# Patient Record
Sex: MALE | Race: WHITE
[De-identification: names, ages, dates, MRNs, and addresses within clinical notes are randomized per-mention and may not be internally consistent; named-entity substitution may affect disease eponyms.]

---

## 2018-04-05 ENCOUNTER — HOSPITAL ENCOUNTER (OUTPATIENT)
Dept: HOSPITAL 24 - SURG1 | Age: 78
Discharge: HOME | End: 2018-04-05
Attending: INTERNAL MEDICINE
Payer: COMMERCIAL

## 2018-04-05 VITALS — DIASTOLIC BLOOD PRESSURE: 61 MMHG | SYSTOLIC BLOOD PRESSURE: 109 MMHG

## 2018-04-05 DIAGNOSIS — K29.60: ICD-10-CM

## 2018-04-05 DIAGNOSIS — K21.9: ICD-10-CM

## 2018-04-05 DIAGNOSIS — K20.8: ICD-10-CM

## 2018-04-05 DIAGNOSIS — R13.19: Primary | ICD-10-CM

## 2018-04-05 DIAGNOSIS — K22.2: ICD-10-CM

## 2018-04-05 DIAGNOSIS — R11.2: ICD-10-CM

## 2018-04-05 PROCEDURE — 0DJ08ZZ INSPECTION OF UPPER INTESTINAL TRACT, VIA NATURAL OR ARTIFICIAL OPENING ENDOSCOPIC: ICD-10-PCS | Performed by: INTERNAL MEDICINE

## 2018-04-05 PROCEDURE — A4217 STERILE WATER/SALINE, 500 ML: HCPCS

## 2018-04-05 PROCEDURE — 99100 ANES PT EXTEME AGE<1 YR&>70: CPT

## 2018-04-05 PROCEDURE — 0DB68ZX EXCISION OF STOMACH, VIA NATURAL OR ARTIFICIAL OPENING ENDOSCOPIC, DIAGNOSTIC: ICD-10-PCS | Performed by: INTERNAL MEDICINE

## 2018-04-05 PROCEDURE — 0D757ZZ DILATION OF ESOPHAGUS, VIA NATURAL OR ARTIFICIAL OPENING: ICD-10-PCS | Performed by: INTERNAL MEDICINE

## 2018-04-11 ENCOUNTER — HOSPITAL ENCOUNTER (OUTPATIENT)
Dept: HOSPITAL 24 - MED/SURG | Age: 78
Setting detail: OBSERVATION
LOS: 2 days | Discharge: HOME | End: 2018-04-13
Attending: INTERNAL MEDICINE | Admitting: INTERNAL MEDICINE
Payer: COMMERCIAL

## 2018-04-11 VITALS — BODY MASS INDEX: 17.8 KG/M2

## 2018-04-11 DIAGNOSIS — R11.10: ICD-10-CM

## 2018-04-11 DIAGNOSIS — E55.9: ICD-10-CM

## 2018-04-11 DIAGNOSIS — K21.9: ICD-10-CM

## 2018-04-11 DIAGNOSIS — R63.0: ICD-10-CM

## 2018-04-11 DIAGNOSIS — I10: ICD-10-CM

## 2018-04-11 DIAGNOSIS — D64.89: ICD-10-CM

## 2018-04-11 DIAGNOSIS — R13.11: ICD-10-CM

## 2018-04-11 DIAGNOSIS — R26.89: ICD-10-CM

## 2018-04-11 DIAGNOSIS — E86.0: Primary | ICD-10-CM

## 2018-04-11 DIAGNOSIS — R53.1: ICD-10-CM

## 2018-04-11 DIAGNOSIS — F32.89: ICD-10-CM

## 2018-04-11 DIAGNOSIS — M51.16: ICD-10-CM

## 2018-04-11 DIAGNOSIS — R06.02: ICD-10-CM

## 2018-04-11 DIAGNOSIS — R94.4: ICD-10-CM

## 2018-04-11 LAB
ALBUMIN SERPL BCP-MCNC: 3.3 G/DL (ref 3.4–5)
ALP SERPL-CCNC: 70 UNITS/L (ref 46–116)
ALT SERPL W P-5'-P-CCNC: 18 UNITS/L (ref 12–78)
AST SERPL W P-5'-P-CCNC: 25 UNITS/L (ref 15–37)
BACTERIA URNS QL MICRO: (no result) /HPF
BASOPHILS # BLD AUTO: 0.1 X10^3/UL (ref 0–0.1)
BASOPHILS NFR BLD AUTO: 1.6 % (ref 0.2–1)
BILIRUB UR QL STRIP.AUTO: NEGATIVE
BUN SERPL-MCNC: 21 MG/DL (ref 7–18)
CALCIUM ALBUM COR SERPL-SCNC: (no result) MMOL/L (ref 136–145)
CALCIUM ALBUM COR SERPL-SCNC: 9 MG/DL (ref 8.5–10.1)
CALCIUM SERPL-MCNC: 8.4 MG/DL (ref 8.5–10.1)
CHLORIDE SERPL-SCNC: 100 MMOL/L (ref 98–107)
CLARITY UR: (no result)
CO2 SERPL-SCNC: 31.4 MMOL/L (ref 21–32)
COARSE GRAN CASTS URNS QL MICRO: (no result) /HPF
COLOR UR AUTO: (no result)
CREAT SERPL-MCNC: 1.71 MG/DL (ref 0.7–1.3)
EGFR  BLACK RACES: 50 (ref 60–?)
EOSINOPHIL # BLD AUTO: 0.2 X10^3/UL (ref 0–0.2)
EOSINOPHIL NFR BLD AUTO: 2.3 % (ref 0.9–2.9)
ERYTHROCYTE [DISTWIDTH] IN BLOOD BY AUTOMATED COUNT: 15.9 % (ref 11.6–16.5)
GLUCOSE UR QL STRIP.AUTO: NEGATIVE
HCT VFR BLD AUTO: 32.8 % (ref 42–54)
HGB BLD-MCNC: 11 G/DL (ref 13.5–18)
KETONES UR QL STRIP.AUTO: NEGATIVE
LEUKOCYTE ESTERASE UR QL STRIP.AUTO: (no result)
LYMPHOCYTES # BLD AUTO: 2.1 X10^3/UL (ref 1.3–2.9)
LYMPHOCYTES NFR BLD AUTO: 23.8 % (ref 21–51)
MCH RBC QN AUTO: 29 PG (ref 27–34)
MCHC RBC AUTO-ENTMCNC: 33.6 G/DL (ref 33–35)
MCV RBC AUTO: 86.2 FL (ref 80–100)
MONOCYTES # BLD AUTO: 0.6 X10^3/UL (ref 0.3–0.8)
MONOCYTES NFR BLD AUTO: 7.3 % (ref 0–13)
NEUTROPHILS # BLD AUTO: 5.7 X10^3/UL (ref 2.2–4.8)
NEUTROPHILS NFR BLD AUTO: 65 % (ref 42–75)
NITRITE UR QL STRIP.AUTO: NEGATIVE
PH UR STRIP.AUTO: 6 [PH] (ref 5–8)
PLATELET # BLD: 229 X10^3/UL (ref 150–450)
PMV BLD AUTO: 9 FL (ref 7.4–11)
PROT SERPL-MCNC: 7.9 G/DL (ref 6.4–8.2)
PROT UR QL STRIP.AUTO: (no result)
RBC # BLD AUTO: 3.8 X10^6/UL (ref 4.7–6)
RBC # UR STRIP.AUTO: (no result) /UL
RBC #/AREA URNS HPF: (no result) /HPF
SODIUM SERPL-SCNC: 137 MMOL/L (ref 136–145)
SP GR UR STRIP.AUTO: 1.01 (ref 1–1.03)
SQUAMOUS URNS QL MICRO: (no result) /HPF
UROBILINOGEN UR QL STRIP.AUTO: (no result)
WBC NRBC COR # BLD AUTO: 8.7 X10^3/UL (ref 3.6–10)

## 2018-04-11 PROCEDURE — 36415 COLL VENOUS BLD VENIPUNCTURE: CPT

## 2018-04-11 PROCEDURE — G0378 HOSPITAL OBSERVATION PER HR: HCPCS

## 2018-04-11 PROCEDURE — S0028 INJECTION, FAMOTIDINE, 20 MG: HCPCS

## 2018-04-11 PROCEDURE — S0179 MEGESTROL 20 MG: HCPCS

## 2018-04-11 PROCEDURE — 94760 N-INVAS EAR/PLS OXIMETRY 1: CPT

## 2018-04-11 PROCEDURE — 85025 COMPLETE CBC W/AUTO DIFF WBC: CPT

## 2018-04-11 PROCEDURE — C9113 INJ PANTOPRAZOLE SODIUM, VIA: HCPCS

## 2018-04-11 PROCEDURE — 81001 URINALYSIS AUTO W/SCOPE: CPT

## 2018-04-11 PROCEDURE — 71045 X-RAY EXAM CHEST 1 VIEW: CPT

## 2018-04-11 PROCEDURE — 83735 ASSAY OF MAGNESIUM: CPT

## 2018-04-11 PROCEDURE — 80053 COMPREHEN METABOLIC PANEL: CPT

## 2018-04-11 RX ADMIN — VENLAFAXINE HYDROCHLORIDE SCH MG: 75 CAPSULE, EXTENDED RELEASE ORAL at 11:12

## 2018-04-11 RX ADMIN — FAMOTIDINE SCH MLS/HR: 20 INJECTION, SOLUTION INTRAVENOUS at 11:12

## 2018-04-11 RX ADMIN — PANTOPRAZOLE SODIUM SCH: 40 INJECTION, POWDER, FOR SOLUTION INTRAVENOUS at 11:08

## 2018-04-11 RX ADMIN — SODIUM CHLORIDE SCH MLS/HR: 9 INJECTION, SOLUTION INTRAVENOUS at 10:50

## 2018-04-11 RX ADMIN — PANTOPRAZOLE SODIUM SCH MG: 40 INJECTION, POWDER, FOR SOLUTION INTRAVENOUS at 20:33

## 2018-04-11 RX ADMIN — FAMOTIDINE SCH MLS/HR: 20 INJECTION, SOLUTION INTRAVENOUS at 20:33

## 2018-04-12 LAB
ALBUMIN SERPL BCP-MCNC: 2.5 G/DL (ref 3.4–5)
ALP SERPL-CCNC: 59 UNITS/L (ref 46–116)
ALT SERPL W P-5'-P-CCNC: 14 UNITS/L (ref 12–78)
AST SERPL W P-5'-P-CCNC: 14 UNITS/L (ref 15–37)
BASOPHILS # BLD AUTO: 0.1 X10^3/UL (ref 0–0.1)
BASOPHILS NFR BLD AUTO: 1.9 % (ref 0.2–1)
BUN SERPL-MCNC: 19 MG/DL (ref 7–18)
CALCIUM ALBUM COR SERPL-SCNC: (no result) MMOL/L (ref 136–145)
CALCIUM ALBUM COR SERPL-SCNC: 8.7 MG/DL (ref 8.5–10.1)
CALCIUM SERPL-MCNC: 7.5 MG/DL (ref 8.5–10.1)
CHLORIDE SERPL-SCNC: 107 MMOL/L (ref 98–107)
CO2 SERPL-SCNC: 25.5 MMOL/L (ref 21–32)
CREAT SERPL-MCNC: 1.41 MG/DL (ref 0.7–1.3)
EGFR  BLACK RACES: > 60 (ref 60–?)
EOSINOPHIL # BLD AUTO: 0.2 X10^3/UL (ref 0–0.2)
EOSINOPHIL NFR BLD AUTO: 2.9 % (ref 0.9–2.9)
ERYTHROCYTE [DISTWIDTH] IN BLOOD BY AUTOMATED COUNT: 15.5 % (ref 11.6–16.5)
HCT VFR BLD AUTO: 30 % (ref 42–54)
HGB BLD-MCNC: 10.1 G/DL (ref 13.5–18)
LYMPHOCYTES # BLD AUTO: 2.1 X10^3/UL (ref 1.3–2.9)
LYMPHOCYTES NFR BLD AUTO: 27 % (ref 21–51)
MCH RBC QN AUTO: 28.8 PG (ref 27–34)
MCHC RBC AUTO-ENTMCNC: 33.5 G/DL (ref 33–35)
MCV RBC AUTO: 85.9 FL (ref 80–100)
MONOCYTES # BLD AUTO: 0.5 X10^3/UL (ref 0.3–0.8)
MONOCYTES NFR BLD AUTO: 6.9 % (ref 0–13)
NEUTROPHILS # BLD AUTO: 4.9 X10^3/UL (ref 2.2–4.8)
NEUTROPHILS NFR BLD AUTO: 61.3 % (ref 42–75)
PLATELET # BLD: 202 X10^3/UL (ref 150–450)
PMV BLD AUTO: 9.1 FL (ref 7.4–11)
PROT SERPL-MCNC: 6.2 G/DL (ref 6.4–8.2)
RBC # BLD AUTO: 3.49 X10^6/UL (ref 4.7–6)
SODIUM SERPL-SCNC: 140 MMOL/L (ref 136–145)
WBC NRBC COR # BLD AUTO: 7.9 X10^3/UL (ref 3.6–10)

## 2018-04-12 RX ADMIN — PANTOPRAZOLE SODIUM SCH MG: 40 INJECTION, POWDER, FOR SOLUTION INTRAVENOUS at 20:27

## 2018-04-12 RX ADMIN — FAMOTIDINE SCH MLS/HR: 20 INJECTION, SOLUTION INTRAVENOUS at 09:09

## 2018-04-12 RX ADMIN — VENLAFAXINE HYDROCHLORIDE SCH MG: 75 CAPSULE, EXTENDED RELEASE ORAL at 09:10

## 2018-04-12 RX ADMIN — SODIUM CHLORIDE SCH MLS/HR: 9 INJECTION, SOLUTION INTRAVENOUS at 14:35

## 2018-04-12 RX ADMIN — SODIUM CHLORIDE SCH MLS/HR: 9 INJECTION, SOLUTION INTRAVENOUS at 01:18

## 2018-04-12 RX ADMIN — MEGESTROL ACETATE SCH MG: 40 TABLET ORAL at 14:35

## 2018-04-12 RX ADMIN — PANTOPRAZOLE SODIUM SCH MG: 40 INJECTION, POWDER, FOR SOLUTION INTRAVENOUS at 09:09

## 2018-04-12 RX ADMIN — CARVEDILOL SCH MG: 6.25 TABLET, FILM COATED ORAL at 20:28

## 2018-04-12 RX ADMIN — FAMOTIDINE SCH MLS/HR: 20 INJECTION, SOLUTION INTRAVENOUS at 20:27

## 2018-04-12 RX ADMIN — MEGESTROL ACETATE SCH MG: 40 TABLET ORAL at 20:28

## 2018-04-12 RX ADMIN — AMIODARONE HYDROCHLORIDE SCH MG: 200 TABLET ORAL at 14:35

## 2018-04-13 VITALS — SYSTOLIC BLOOD PRESSURE: 139 MMHG | DIASTOLIC BLOOD PRESSURE: 68 MMHG

## 2018-04-13 LAB
ALBUMIN SERPL BCP-MCNC: 2.7 G/DL (ref 3.4–5)
ALP SERPL-CCNC: 61 UNITS/L (ref 46–116)
ALT SERPL W P-5'-P-CCNC: 17 UNITS/L (ref 12–78)
AST SERPL W P-5'-P-CCNC: 19 UNITS/L (ref 15–37)
BASOPHILS # BLD AUTO: 0.1 X10^3/UL (ref 0–0.1)
BASOPHILS NFR BLD AUTO: 1.5 % (ref 0.2–1)
BUN SERPL-MCNC: 13 MG/DL (ref 7–18)
CALCIUM ALBUM COR SERPL-SCNC: (no result) MMOL/L (ref 136–145)
CALCIUM ALBUM COR SERPL-SCNC: 8.8 MG/DL (ref 8.5–10.1)
CALCIUM SERPL-MCNC: 7.8 MG/DL (ref 8.5–10.1)
CHLORIDE SERPL-SCNC: 108 MMOL/L (ref 98–107)
CO2 SERPL-SCNC: 23.6 MMOL/L (ref 21–32)
CREAT SERPL-MCNC: 1.23 MG/DL (ref 0.7–1.3)
EGFR  BLACK RACES: > 60 (ref 60–?)
EOSINOPHIL # BLD AUTO: 0.2 X10^3/UL (ref 0–0.2)
EOSINOPHIL NFR BLD AUTO: 1.7 % (ref 0.9–2.9)
ERYTHROCYTE [DISTWIDTH] IN BLOOD BY AUTOMATED COUNT: 15.5 % (ref 11.6–16.5)
HCT VFR BLD AUTO: 30.7 % (ref 42–54)
HGB BLD-MCNC: 10.4 G/DL (ref 13.5–18)
LYMPHOCYTES # BLD AUTO: 2.2 X10^3/UL (ref 1.3–2.9)
LYMPHOCYTES NFR BLD AUTO: 22.1 % (ref 21–51)
MCH RBC QN AUTO: 29.4 PG (ref 27–34)
MCHC RBC AUTO-ENTMCNC: 34 G/DL (ref 33–35)
MCV RBC AUTO: 86.4 FL (ref 80–100)
MONOCYTES # BLD AUTO: 0.8 X10^3/UL (ref 0.3–0.8)
MONOCYTES NFR BLD AUTO: 7.7 % (ref 0–13)
NEUTROPHILS # BLD AUTO: 6.6 X10^3/UL (ref 2.2–4.8)
NEUTROPHILS NFR BLD AUTO: 67 % (ref 42–75)
PLATELET # BLD: 211 X10^3/UL (ref 150–450)
PMV BLD AUTO: 9.1 FL (ref 7.4–11)
PROT SERPL-MCNC: 6.5 G/DL (ref 6.4–8.2)
RBC # BLD AUTO: 3.56 X10^6/UL (ref 4.7–6)
SODIUM SERPL-SCNC: 140 MMOL/L (ref 136–145)
WBC NRBC COR # BLD AUTO: 9.8 X10^3/UL (ref 3.6–10)

## 2018-04-13 RX ADMIN — SODIUM CHLORIDE SCH MLS/HR: 9 INJECTION, SOLUTION INTRAVENOUS at 04:18

## 2018-04-13 RX ADMIN — MEGESTROL ACETATE SCH MG: 40 TABLET ORAL at 09:52

## 2018-04-13 RX ADMIN — FAMOTIDINE SCH MLS/HR: 20 INJECTION, SOLUTION INTRAVENOUS at 09:53

## 2018-04-13 RX ADMIN — PANTOPRAZOLE SODIUM SCH MG: 40 INJECTION, POWDER, FOR SOLUTION INTRAVENOUS at 09:53

## 2018-04-13 RX ADMIN — VENLAFAXINE HYDROCHLORIDE SCH MG: 75 CAPSULE, EXTENDED RELEASE ORAL at 09:52

## 2018-04-13 RX ADMIN — AMIODARONE HYDROCHLORIDE SCH MG: 200 TABLET ORAL at 09:52

## 2018-04-13 RX ADMIN — CARVEDILOL SCH MG: 6.25 TABLET, FILM COATED ORAL at 09:52

## 2018-04-15 ENCOUNTER — HOSPITAL ENCOUNTER (EMERGENCY)
Dept: HOSPITAL 24 - ER | Age: 78
Discharge: HOME | End: 2018-04-15
Payer: COMMERCIAL

## 2018-04-15 VITALS — SYSTOLIC BLOOD PRESSURE: 122 MMHG | DIASTOLIC BLOOD PRESSURE: 78 MMHG

## 2018-04-15 VITALS — BODY MASS INDEX: 16.7 KG/M2

## 2018-04-15 DIAGNOSIS — S00.33XA: ICD-10-CM
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
## 2018-04-15 DIAGNOSIS — S01.01XA: Primary | ICD-10-CM

## 2018-04-15 DIAGNOSIS — S51.812A: ICD-10-CM

## 2018-04-15 DIAGNOSIS — S00.03XA: ICD-10-CM
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
## 2018-04-15 DIAGNOSIS — S00.31XA: ICD-10-CM

## 2018-04-15 PROCEDURE — 99282 EMERGENCY DEPT VISIT SF MDM: CPT

## 2018-04-15 PROCEDURE — 0WQ2XZZ REPAIR FACE, EXTERNAL APPROACH: ICD-10-PCS | Performed by: INTERNAL MEDICINE

## 2018-04-15 PROCEDURE — 12013 RPR F/E/E/N/L/M 2.6-5.0 CM: CPT

## 2018-04-15 PROCEDURE — 70450 CT HEAD/BRAIN W/O DYE: CPT

## 2018-04-15 NOTE — DR.GENAD
HPI





- PCP


Primary Care Physician: vamsi





- Complaint/Symptoms


Chief Complaint Doctors Comments: FELL AT HOME AND HIT HEAD ON TUB AND SUSTAIN 

SKIN TEAR TO LT FOREARM AND LACERATION TO LT EYEBROW. PATIENT LOC. TD UTD.


Chief Complaint:: pt fell and hit head on bath tub laceration to lt eye brow 

with loc





- Nurses notes reviewed


Nurses Notes Review: Yes





- Source


History Provided: Patient





- Mode of Arrival


Mode of Arrival: Wheelchair





- Timing


Onset of Chief Complaint: 04/15/18


Came on: Suddenly





- Duration


Duration: Constant


Duration: Days





- Severity


Severity: Moderate





PMH





- PMH


Past Medical History: Yes


Past Medical History: Anxiety, Arthritis, Hypertension, Hypothyroidism


Past Surgical History: Yes


Surgical History: Bowel Resection





- Family History


History of Family Medical Conditions: No


Family Medical History: Cancer, Heart Failure, Hypertension





- Social History


Does patient currently use any type of tobacco product: No


Have you used tobacco products in the last 12 months: No


Does any household member use tobacco: No


Alcohol Use: Occasionally


Do you use any recreational Drugs:: No


Lives Where: Home





- infectious screening


In the last 2 months have you had wt loss of >10#?: NO


Have you had fever, night sweats or hemotysis?: No


Have you traveled outside the country in the last 6 months?: No


Isolation: Standard





ROS





- Review of Systems


Constitutional: No Symptoms Reported


Eyes: Other (LT EARBROW LACERATION 3CM.)


ENTM: Nose Pain (SWELLING AND ABRSION ON NOSE WITH PAIN.)


Respiratoy: No Symptoms Reported


Cardiovascular: No Symptoms Reported


Gastrointestinal/Abdominal: No Symptoms Reported


Genitourinary: No Symptoms Reported


Neurological: No Symptoms Reported


Musculoskeletal: No Symptoms Reported


Integumentary: Other (ABRASION NOSE, SKIN TEAR LT FOREARM.)


Hematologic/Lymphatic: Easy Bleeding, Easy Bruising


Endocrine: No Symptoms Reported.  negative: Flushing


All Other Systems: Reviewed and Negative





PE





- Vital Signs


Vitals: 


 





Temperature                      97.6 F


Pulse Rate                       58


Respiratory Rate                 20


Blood Pressure [Left Arm]        122/78


Blood Pressure [Right Arm]       163/84


Blood Pressure [Right Radial     117/68


Artery]                          


Blood Pressure                   137/66


O2 Sat by Pulse Oximetry         98











- General


Limitations: No Limitations


General Appearance: Alert





- Head


Head Exam: Other (3CM LAC LT EYEBROW.)





- Eyes


Eye exam: PERRL, EOMI, Periorbital Swelling (LT EYE)





- ENT


ENT Exam: Normal  External Ear Exam


External Ear Exam: Normal External Inspection


TM/Canal Exam: Bilateral Normal


Nose Exam: Abrasion (NOSE)


Mouth Exam: Normal Inspection


Throat Exam: Normal Inspection





- Neck


Neck Exam: Trachea Midline.  negative: Tenderness, Meningismus, Lymphadenopathy





- Chest


Chest Inspection: Symmetric Chest Wall Rise





- Respiratory


Respiratory Exam: Normal Lung Sounds Bilat


Respiratory Exam: Bilateral Clear to Auscultation





- Cardiovascular


Cardiovascular Exam: Regular Rate, Normal Rhythm, Normal Heart Sounds





- Abdominal Exam


Abdominal Exam: Normal Bowel Sounds, Soft.  negative: Tenderness





- Extremities


Extremities Exam: Tenderness (SKIN TEAR LT FOREARM)





- Back


Back Exam: Normal Inspection





- Neurologic


Neurological Exam: Alert, Oriented X3





- Psychiatric


Psychiatric Exam: Normal Affect, Normal Mood





- Skin


Skin Exam: Erythema





MDM





- Additional Information


Additional Information Obtained From: Family





- Differential Diagnosis


Differential Diagnosis: SCALP CONTUSION, LAC LT EYEBROW. SKIN TEAR LT FOREARM





Course





- Treatment


Treatment: SEE ORDERS.





- Education/Counseling


Education/Counseling: Patient, Family, Education


Educated On: Diagnosis, Needs for Follow Up





ROR





- XRAY


XRAY Interpreted by: Radiologist


XRAY Findings: REPORT DISCUSS WITH PATIENT.





Procedures





- Laceration/Wound Repair


  ** Left Face


Wound Length (cm): 3


Wound's Depth, Shape: Linear, Irregular


Wound Explored: contaminated


Betadine Prep?: Yes


Anesthesia: 1% Lidocaine, 1% Lidocaine w/ Epi


Wound Debrided: minimal


Wound Repaired With: sutures


Suture Size/Type: 4:0, Ethilion


Number of Sutures: 6


Layer Closure?: No


Sterile Dressing Applied?: Yes


Splint Applied?: No


Sling Applied?: No





- Diagnosis


Discharge Problem: 


Laceration of scalp


Qualifiers:


 Encounter type: initial encounter Qualified Code(s): S01.01XA - Laceration 

without foreign body of scalp, initial encounter





Contusion of scalp


Qualifiers:


 Encounter type: initial encounter Qualified Code(s): S00.03XA - Contusion of 

scalp, initial encounter





Abrasion of nose


Qualifiers:


 Encounter type: initial encounter Qualified Code(s): S00.31XA - Abrasion of 

nose, initial encounter





Contusion, nose


Qualifiers:


 Encounter type: initial encounter Qualified Code(s): S00.33XA - Contusion of 

nose, initial encounter





Skin tear of left forearm without complication


Qualifiers:


 Encounter type: initial encounter Qualified Code(s): S51.812A - Laceration 

without foreign body of left forearm, initial encounter








- Discharge Plan


Condition: Stable





- Follow ups/Referrals


Follow ups/Referrals: 


Gilberto Curtis [Primary Care Provider] - 3 days





- Instructions


Instructions:  Abrasion, Facial or Scalp Contusion, Easy-to-Read, Skin Tear Care

, Laceration Care, Adult, Easy-to-Read


Additional Instructions: 


RETURN TO ED IF WORSE. SUTURE OUT IN 10 DAYS.

## 2018-04-15 NOTE — CT
CT HEAD WITHOUT CONTRAST



CLINICAL HISTORY: 78-year-old male status post fall with loss of consciousness and laceration to the 
left frontal scalp.



COMPARISON: None.



TECHNIQUE:  Multiple, non-contrasted axial CT images were obtained from the skull base to the cranial
 vertex. Coronal and sagittal reformats were performed.



FINDINGS: There are no abnormal intra- or extra-axial fluid collections, midline shift, or mass effec
t. Gray-white differentiation is normal. Partially empty sella. Global cortical involutional changes 
are present that are advanced for the patient's stated age. The ventricular system is mildly enlarged
 but commensurate with the degree of sulcal prominence. Periventricular and supraventricular white ma
tter hypodensity is present that is nonspecific in appearance, but most likely to represent microvasc
ular ischemic changes. Atherosclerotic vascular calcification is present within the carotid siphons a
nd distal vertebral arteries. 



Left frontal scalp defect with associated small contusion/hematoma.



The imaged paranasal sinuses, mastoid air cells, and tympanic spaces are clear. Bilateral lens implan
ts.



IMPRESSION:



1. No definite evidence of an acute intracranial process.    

2. Small left frontal scalp defect with associated contusion/hematoma.

3. Severe microvascular white matter ischemic changes, with associated volume loss.





Reported By:Electronically Signed by KEVIN DAVIS MD at 4/15/2018 9:25:49 PM

## 2018-04-25 NOTE — DR.UPDATE
H&P Update


History and Physical Update: 


History and Physical reviewed and patient examined.





Changes noted:       Yes with the following:





 PRESENTED TO THE OFFICE TODAY WITH COMPLAINTS OF GENERALIZED WEAKNESS 

AND DECREASED APPETITE. PATIENTS SPOUSE REPORTS THAT HE HAS HAD AN UNSTEADY 

GAIT FOR THE PAST TWO DAYS. ON EXAMINATION, SKIN TURGOR NOTED. HEART REGULAR IN 

RATE AND RHYTHM. BILATERAL LUNGS NOTED WITH DIMINISHED LUNG SOUNDS THROUGHOUT.  

ABDOMEN FLAT, SOFT, NON-TENDER WITH NORMAL BOWEL SOUNDS NOTED IN ALL QUADRANTS. 

WE PLANNED TO ADMIT PATIENT FOR FURTHER EVALUATION AND TREATMENT. ON ADMISSION, 

WE WILL OBTAIN A CBC, CMP, URINALYSIS, AND A CHEST XRAY. HE WILL BE STARTED ON 

NORMAL SALINE AT 80ML/HR. OTHERWISE, WE WILL FOLLOW UP WITH AM LABS AND 

CONTINUE TO MONITOR PATIENT.

## 2018-04-25 NOTE — PCM.PROG
Progress Note





- Progress Note for Day of


Date: 04/12/18





- Subjective


Subjective:  WAS ADMITTED FOR DEHYDRATION AND GENERALIZED WEAKNESS. 

TODAY, HE IS ALERT AND ORIENTED, LYING IN BED ON MORNING ROUNDS. HE CONTINUES 

WITH COMPLAINTS OF GENERALIZED WEAKNESS. HE ALSO REPORTS NAUSEA, BUT DENIES 

VOMITING SINCE YESTERDAY. STAFF REPORTS THAT PATIENT HAS HAD INTERMITTENT 

CONFUSION THROUGHOUT THE NIGHT AND ALSO CONTINUES WITH AN UNSTEADY GAIT. ON 

EXAMINATION, HEART IS REGULAR IN RATE AND RHYTHM. BILATERAL LUNGS CONTINUE WITH 

DIMINISHED LUNG SOUNDS THROGHOUT. ABDOMEN IS FLAT, SOFT, AND NON-TENDER WITH 

NORMAL BOWEL SOUNDS NOTED IN ALL QUADRANTS. NORMAL RANGE OF MOTION NOTED TO 

EXTREMITIES. HIS VITALS THIS MORNING ARE 98.2-64-16-91%-147/79. LABS WERE 

OBTAINED. ABNORMAL LAB VALUES INCLUDE THE FOLLOWING: RBC 3.56, HGB 10.4, HCT 

30.7, CHLORIDE 108, CALCIUM 7.8, ALBUMIN 2.7. TODAY, WE WILL CONTINUE WITH IV 

HYDRATION AND PHYSICAL THERAPY. OTHERWISE, WE WILL FOLLOW UP WITH AM LABS AND 

CONTINUE TO MONITOR PATIENT.





- Past Medical Family Social History


Past Med/Fam/Surg Hx: No changes since H&P


Allergies: 


Allergies





baclofen Allergy (Verified 04/11/18 10:28)


 











- Review of Systems


ROS: No change since H&P





- Vital Signs and I&O's


Vital Signs: 


 





Temperature                      98.6 F


Pulse Rate [Left Brachial]       67


Respiratory Rate                 20


Blood Pressure [Left Arm]        139/68


Blood Pressure [Right Arm]       163/84


Blood Pressure [Right Radial     117/68


Artery]                          


Blood Pressure                   109/61


O2 Sat by Pulse Oximetry         94











- Physical Exam


Oriented: Normal


Eyes: Normal


Ear: Normal


Nose: Normal


Throat: Normal


Respiratory: Generalized, Diminished


Cardiovascular: Normal


: Normal


Auscultation: Bowel Sounds: Normal


Palpation: Normal


Tenderness: Normal


Skin: Decreased Turgur


Musculoskeletal: Normal


Psychiatric: Normal


Mood Description: Calm


Affect: Normal


Speech Pattern: Clear, Appropriate





- Laboratory and Diagnostics


Result Diagrams: 


 04/13/18 05:33





 04/13/18 05:33


Labs: 


 Laboratory











WBC  9.8 X10^3/uL (3.6-10.0)   04/13/18  05:33    


 


RBC  3.56 X10^6/uL (4.7-6.0)  L  04/13/18  05:33    


 


Hgb  10.4 g/dL (13.5-18.0)  L  04/13/18  05:33    


 


Hct  30.7 % (42.0-54.0)  L  04/13/18  05:33    


 


MCV  86.4 fL (80.0-100.0)   04/13/18  05:33    


 


MCH  29.4 pg (27.0-34.0)   04/13/18  05:33    


 


MCHC  34.0 g/dL (33.0-35.0)   04/13/18  05:33    


 


RDW  15.5 % (11.6-16.5)   04/13/18  05:33    


 


Plt Count  211 X10^3/uL (150.0-450.0)   04/13/18  05:33    


 


MPV  9.1 fL (7.4-11.0)   04/13/18  05:33    


 


Neut % (Auto)  67.0 % (42.0-75.0)   04/13/18  05:33    


 


Lymph % (Auto)  22.1 % (21.0-51.0)   04/13/18  05:33    


 


Mono % (Auto)  7.7 % (0.0-13.0)   04/13/18  05:33    


 


Eos % (Auto)  1.7 % (0.9-2.9)   04/13/18  05:33    


 


Baso % (Auto)  1.5 % (0.2-1.0)  H  04/13/18  05:33    


 


Neut # (Auto)  6.6 x10^3/uL (2.2-4.8)  H  04/13/18  05:33    


 


Lymph # (Auto)  2.2 X10^3/uL (1.3-2.9)   04/13/18  05:33    


 


Mono # (Auto)  0.8 x10^3/uL (0.3-0.8)   04/13/18  05:33    


 


Eos # (Auto)  0.2 x10^3/uL (0.0-0.2)   04/13/18  05:33    


 


Baso # (Auto)  0.1 X10^3/uL (0.0-0.1)   04/13/18  05:33    


 


Absolute Nucleated RBC  0.0 /100WBC  04/13/18  05:33    


 


Sodium  140 mmol/L (136-145)   04/13/18  05:33    


 


Corrected Sodium  TNP   04/13/18  05:33    


 


Potassium  3.9 mmol/L (3.5-5.1)   04/13/18  05:33    


 


Chloride  108 mmol/L ()  H  04/13/18  05:33    


 


Carbon Dioxide  23.6 mmol/L (21-32)   04/13/18  05:33    


 


BUN  13 mg/dL (7-18)   04/13/18  05:33    


 


Creatinine  1.23 mg/dL (0.70-1.30)   04/13/18  05:33    


 


Est GFR (MDRD) Af Amer  > 60  (>60)   04/13/18  05:33    


 


Est GFR (MDRD) Non-Af  > 60  (>60)   04/13/18  05:33    


 


Glucose  87 mg/dL (65-99)   04/13/18  05:33    


 


Calcium  7.8 mg/dL (8.5-10.1)  L  04/13/18  05:33    


 


Corrected Calcium  8.8 mg/dL (8.5-10.1)   04/13/18  05:33    


 


Magnesium  1.8 mg/dL (1.7-2.9)   04/12/18  04:50    


 


Total Bilirubin  0.90 mg/dL (0.2-1.0)   04/13/18  05:33    


 


AST  19 Units/L (15-37)   04/13/18  05:33    


 


ALT  17 Units/L (12-78)   04/13/18  05:33    


 


Alkaline Phosphatase  61 Units/L ()   04/13/18  05:33    


 


Total Protein  6.5 g/dL (6.4-8.2)   04/13/18  05:33    


 


Albumin  2.7 g/dL (3.4-5.0)  L  04/13/18  05:33    


 


Globulin  3.8 g/dL (2.5-4.5)   04/13/18  05:33    


 


Albumin/Globulin Ratio  0.7 Ratio (1.1-2.1)  L  04/13/18  05:33    


 


Specimen Type  Clean catch urine   04/11/18  12:48    


 


Urine Color  Dark yellow  (YELLOW)   04/11/18  12:48    


 


Urine Appearance  Hazy  (CLEAR)   04/11/18  12:48    


 


Urine pH  6.0  (5.0 - 8.0)   04/11/18  12:48    


 


Ur Specific Gravity  1.015  (1.000-1.030)   04/11/18  12:48    


 


Urine Protein  2+  (NEGATIVE)   04/11/18  12:48    


 


Urine Glucose (UA)  Negative  (NEGATIVE)   04/11/18  12:48    


 


Urine Ketones  Negative  (NEGATIVE)   04/11/18  12:48    


 


Urine Occult Blood  1+  (NEGATIVE)   04/11/18  12:48    


 


Urine Nitrite  Negative  (NEGATIVE)   04/11/18  12:48    


 


Urine Bilirubin  Negative  (NEGATIVE)   04/11/18  12:48    


 


Urine Urobilinogen  2+  (NORMAL)   04/11/18  12:48    


 


Ur Leukocyte Esterase  1+  (NEGATIVE)   04/11/18  12:48    


 


Urine RBC  0-2 /HPF (NONE SEEN)   04/11/18  12:48    


 


Urine WBC  0-2 /HPF (NONE SEEN)   04/11/18  12:48    


 


Ur Squamous Epith Cells  Few /HPF (NEGATIVE)   04/11/18  12:48    


 


Urine Bacteria  Trace /HPF (NEGATIVE)   04/11/18  12:48    


 


Coarse Granular Casts  Rare /HPF (NEGATIVE)   04/11/18  12:48    


 


Ur Culture Indicated?  No/not indicated   04/11/18  12:48    














- Plan


(1) Dehydration


Status: Acute   


Plan: NORMAL SALINE AT 80ML/HR, CONTINUE TO MONITOR   





(2) Generalized weakness


Status: Acute   


Plan: NORMAL SALINE AT 80ML/HR, PHYSICAL THERAPY, CONTINUE TO MONTIOR   





(3) Decreased appetite


Status: Chronic   


Plan: CONTINUE MEGACE, CONTINUE TO MONITOR

## 2018-05-04 ENCOUNTER — HOSPITAL ENCOUNTER (INPATIENT)
Dept: HOSPITAL 24 - ER | Age: 78
LOS: 1 days | DRG: 204 | End: 2018-05-05
Attending: INTERNAL MEDICINE | Admitting: FAMILY MEDICINE
Payer: COMMERCIAL

## 2018-05-04 VITALS — BODY MASS INDEX: 17.4 KG/M2

## 2018-05-04 DIAGNOSIS — I87.2: ICD-10-CM

## 2018-05-04 DIAGNOSIS — I46.9: ICD-10-CM

## 2018-05-04 DIAGNOSIS — I25.10: ICD-10-CM

## 2018-05-04 DIAGNOSIS — R53.1: ICD-10-CM

## 2018-05-04 DIAGNOSIS — Z66: ICD-10-CM

## 2018-05-04 DIAGNOSIS — R29.6: ICD-10-CM

## 2018-05-04 DIAGNOSIS — R79.89: ICD-10-CM

## 2018-05-04 DIAGNOSIS — J18.8: ICD-10-CM

## 2018-05-04 DIAGNOSIS — R06.2: ICD-10-CM

## 2018-05-04 DIAGNOSIS — E87.6: ICD-10-CM

## 2018-05-04 DIAGNOSIS — Z78.1: ICD-10-CM

## 2018-05-04 DIAGNOSIS — R06.03: Primary | ICD-10-CM

## 2018-05-04 DIAGNOSIS — E86.0: ICD-10-CM

## 2018-05-04 DIAGNOSIS — I95.89: ICD-10-CM

## 2018-05-04 DIAGNOSIS — I10: ICD-10-CM

## 2018-05-04 DIAGNOSIS — R94.31: ICD-10-CM

## 2018-05-04 LAB
ALBUMIN SERPL BCP-MCNC: 2.2 G/DL (ref 3.4–5)
ALBUMIN SERPL BCP-MCNC: 2.4 G/DL (ref 3.4–5)
ALP SERPL-CCNC: 59 UNITS/L (ref 46–116)
ALP SERPL-CCNC: 63 UNITS/L (ref 46–116)
ALT SERPL W P-5'-P-CCNC: 18 UNITS/L (ref 12–78)
ALT SERPL W P-5'-P-CCNC: 18 UNITS/L (ref 12–78)
AMORPH SED URNS QL MICRO: (no result) /HPF
AST SERPL W P-5'-P-CCNC: 26 UNITS/L (ref 15–37)
AST SERPL W P-5'-P-CCNC: 28 UNITS/L (ref 15–37)
BACTERIA URNS QL MICRO: (no result) /HPF
BASE EXCESS BLDA CALC-SCNC: -0.3 MMOL/L (ref -2–2)
BASE EXCESS BLDA CALC-SCNC: -2.2 MMOL/L (ref -2–2)
BASOPHILS # BLD AUTO: 0.1 X10^3/UL (ref 0–0.1)
BASOPHILS # BLD AUTO: 0.1 X10^3/UL (ref 0–0.1)
BASOPHILS NFR BLD AUTO: 0.8 % (ref 0.2–1)
BASOPHILS NFR BLD AUTO: 0.9 % (ref 0.2–1)
BILIRUB UR QL STRIP.AUTO: NEGATIVE
BUN SERPL-MCNC: 30 MG/DL (ref 7–18)
BUN SERPL-MCNC: 32 MG/DL (ref 7–18)
CALCIUM ALBUM COR SERPL-SCNC: 144 MMOL/L (ref 136–145)
CALCIUM ALBUM COR SERPL-SCNC: 146 MMOL/L (ref 136–145)
CALCIUM ALBUM COR SERPL-SCNC: 8.9 MG/DL (ref 8.5–10.1)
CALCIUM ALBUM COR SERPL-SCNC: 9 MG/DL (ref 8.5–10.1)
CALCIUM SERPL-MCNC: 7.6 MG/DL (ref 8.5–10.1)
CALCIUM SERPL-MCNC: 7.6 MG/DL (ref 8.5–10.1)
CHLORIDE SERPL-SCNC: 108 MMOL/L (ref 98–107)
CHLORIDE SERPL-SCNC: 110 MMOL/L (ref 98–107)
CK MB SERPL-MCNC: 1.9 NG/ML (ref 0–4)
CK MB SERPL-MCNC: 2.2 NG/ML (ref 0–4)
CK MB SERPL-MCNC: 2.4 NG/ML (ref 0–4)
CK SERPL-CCNC: 533 UNITS/L (ref 39–308)
CK SERPL-CCNC: 566 UNITS/L (ref 39–308)
CK SERPL-CCNC: 627 UNITS/L (ref 39–308)
CKMB %: 0.3 % (ref ?–4)
CKMB %: 0.4 % (ref ?–4)
CKMB %: 0.4 % (ref ?–4)
CLARITY UR: CLEAR
CO2 SERPL-SCNC: 21.7 MMOL/L (ref 21–32)
CO2 SERPL-SCNC: 22.9 MMOL/L (ref 21–32)
COLOR UR AUTO: YELLOW
CREAT SERPL-MCNC: 2.03 MG/DL (ref 0.7–1.3)
CREAT SERPL-MCNC: 2.35 MG/DL (ref 0.7–1.3)
EGFR  BLACK RACES: 35 (ref 60–?)
EGFR  BLACK RACES: 41 (ref 60–?)
EOSINOPHIL # BLD AUTO: 0 X10^3/UL (ref 0–0.2)
EOSINOPHIL # BLD AUTO: 0.1 X10^3/UL (ref 0–0.2)
EOSINOPHIL NFR BLD AUTO: 0.2 % (ref 0.9–2.9)
EOSINOPHIL NFR BLD AUTO: 0.7 % (ref 0.9–2.9)
ERYTHROCYTE [DISTWIDTH] IN BLOOD BY AUTOMATED COUNT: 15.5 % (ref 11.6–16.5)
ERYTHROCYTE [DISTWIDTH] IN BLOOD BY AUTOMATED COUNT: 15.8 % (ref 11.6–16.5)
FRACTIONATED INSPIRED OXYGEN: 32
FRACTIONATED INSPIRED OXYGEN: 50
GLUCOSE UR QL STRIP.AUTO: NEGATIVE
HCO3 BLDA-SCNC: 21.1 MMOL/L (ref 22–26)
HCO3 BLDA-SCNC: 22.6 MMOL/L (ref 22–26)
HCT VFR BLD AUTO: 24.4 % (ref 42–54)
HCT VFR BLD AUTO: 25.7 % (ref 42–54)
HGB BLD-MCNC: 8.4 G/DL (ref 13.5–18)
HGB BLD-MCNC: 8.6 G/DL (ref 13.5–18)
KETONES UR QL STRIP.AUTO: NEGATIVE
LEUKOCYTE ESTERASE UR QL STRIP.AUTO: NEGATIVE
LYMPHOCYTES # BLD AUTO: 1.2 X10^3/UL (ref 1.3–2.9)
LYMPHOCYTES # BLD AUTO: 1.4 X10^3/UL (ref 1.3–2.9)
LYMPHOCYTES NFR BLD AUTO: 11.9 % (ref 21–51)
LYMPHOCYTES NFR BLD AUTO: 13 % (ref 21–51)
MCH RBC QN AUTO: 29.2 PG (ref 27–34)
MCH RBC QN AUTO: 29.8 PG (ref 27–34)
MCHC RBC AUTO-ENTMCNC: 33.6 G/DL (ref 33–35)
MCHC RBC AUTO-ENTMCNC: 34.5 G/DL (ref 33–35)
MCV RBC AUTO: 86.3 FL (ref 80–100)
MCV RBC AUTO: 86.9 FL (ref 80–100)
MONOCYTES # BLD AUTO: 0.8 X10^3/UL (ref 0.3–0.8)
MONOCYTES # BLD AUTO: 0.8 X10^3/UL (ref 0.3–0.8)
MONOCYTES NFR BLD AUTO: 7.6 % (ref 0–13)
MONOCYTES NFR BLD AUTO: 8.1 % (ref 0–13)
NEUTROPHILS # BLD AUTO: 8.1 X10^3/UL (ref 2.2–4.8)
NEUTROPHILS # BLD AUTO: 8.1 X10^3/UL (ref 2.2–4.8)
NEUTROPHILS NFR BLD AUTO: 77.4 % (ref 42–75)
NEUTROPHILS NFR BLD AUTO: 79.4 % (ref 42–75)
NITRITE UR QL STRIP.AUTO: NEGATIVE
PCO2 BLDA: 31 MMHG (ref 35–45)
PCO2 BLDA: 31 MMHG (ref 35–45)
PH BLDA: 7.44 [PH] (ref 7.35–7.45)
PH BLDA: 7.47 [PH] (ref 7.35–7.45)
PH UR STRIP.AUTO: 5 [PH] (ref 5–8)
PLATELET # BLD: 208 X10^3/UL (ref 150–450)
PLATELET # BLD: 226 X10^3/UL (ref 150–450)
PMV BLD AUTO: 8.7 FL (ref 7.4–11)
PMV BLD AUTO: 8.9 FL (ref 7.4–11)
PO2 BLDA: 38 MMHG (ref 80–100)
PO2 BLDA: 56 MMHG (ref 80–100)
PROT SERPL-MCNC: 6.7 G/DL (ref 6.4–8.2)
PROT SERPL-MCNC: 6.9 G/DL (ref 6.4–8.2)
PROT UR QL STRIP.AUTO: (no result)
RBC # BLD AUTO: 2.83 X10^6/UL (ref 4.7–6)
RBC # BLD AUTO: 2.95 X10^6/UL (ref 4.7–6)
RBC # UR STRIP.AUTO: (no result) /UL
RBC #/AREA URNS HPF: (no result) /HPF
SAO2 % BLDA: 75 % (ref 90–100)
SAO2 % BLDA: 91 % (ref 90–100)
SODIUM SERPL-SCNC: 144 MMOL/L (ref 136–145)
SODIUM SERPL-SCNC: 145 MMOL/L (ref 136–145)
SP GR UR STRIP.AUTO: 1.01 (ref 1–1.03)
SQUAMOUS URNS QL MICRO: (no result) /HPF
TROPONIN I SERPL-MCNC: 0.11 NG/ML (ref 0–1.5)
TROPONIN I SERPL-MCNC: 0.14 NG/ML (ref 0–1.5)
TROPONIN I SERPL-MCNC: < 0.02 NG/ML (ref 0–1.5)
UROBILINOGEN UR QL STRIP.AUTO: NORMAL
WBC NRBC COR # BLD AUTO: 10.2 X10^3/UL (ref 3.6–10)
WBC NRBC COR # BLD AUTO: 10.5 X10^3/UL (ref 3.6–10)

## 2018-05-04 PROCEDURE — 84484 ASSAY OF TROPONIN QUANT: CPT

## 2018-05-04 PROCEDURE — 82553 CREATINE MB FRACTION: CPT

## 2018-05-04 PROCEDURE — 51702 INSERT TEMP BLADDER CATH: CPT

## 2018-05-04 PROCEDURE — 87040 BLOOD CULTURE FOR BACTERIA: CPT

## 2018-05-04 PROCEDURE — 93005 ELECTROCARDIOGRAM TRACING: CPT

## 2018-05-04 PROCEDURE — 83605 ASSAY OF LACTIC ACID: CPT

## 2018-05-04 PROCEDURE — 85730 THROMBOPLASTIN TIME PARTIAL: CPT

## 2018-05-04 PROCEDURE — 99285 EMERGENCY DEPT VISIT HI MDM: CPT

## 2018-05-04 PROCEDURE — 36556 INSERT NON-TUNNEL CV CATH: CPT

## 2018-05-04 PROCEDURE — 96365 THER/PROPH/DIAG IV INF INIT: CPT

## 2018-05-04 PROCEDURE — 85025 COMPLETE CBC W/AUTO DIFF WBC: CPT

## 2018-05-04 PROCEDURE — 84100 ASSAY OF PHOSPHORUS: CPT

## 2018-05-04 PROCEDURE — 81001 URINALYSIS AUTO W/SCOPE: CPT

## 2018-05-04 PROCEDURE — 96375 TX/PRO/DX INJ NEW DRUG ADDON: CPT

## 2018-05-04 PROCEDURE — 80053 COMPREHEN METABOLIC PANEL: CPT

## 2018-05-04 PROCEDURE — 84478 ASSAY OF TRIGLYCERIDES: CPT

## 2018-05-04 PROCEDURE — 83735 ASSAY OF MAGNESIUM: CPT

## 2018-05-04 PROCEDURE — 94660 CPAP INITIATION&MGMT: CPT

## 2018-05-04 PROCEDURE — 82550 ASSAY OF CK (CPK): CPT

## 2018-05-04 PROCEDURE — 71045 X-RAY EXAM CHEST 1 VIEW: CPT

## 2018-05-04 PROCEDURE — 36415 COLL VENOUS BLD VENIPUNCTURE: CPT

## 2018-05-04 PROCEDURE — 05H633Z INSERTION OF INFUSION DEVICE INTO LEFT SUBCLAVIAN VEIN, PERCUTANEOUS APPROACH: ICD-10-PCS | Performed by: SURGERY

## 2018-05-04 PROCEDURE — 99284 EMERGENCY DEPT VISIT MOD MDM: CPT

## 2018-05-04 PROCEDURE — 94640 AIRWAY INHALATION TREATMENT: CPT

## 2018-05-04 PROCEDURE — 85610 PROTHROMBIN TIME: CPT

## 2018-05-04 PROCEDURE — 36600 WITHDRAWAL OF ARTERIAL BLOOD: CPT

## 2018-05-04 PROCEDURE — 96367 TX/PROPH/DG ADDL SEQ IV INF: CPT

## 2018-05-04 PROCEDURE — 96374 THER/PROPH/DIAG INJ IV PUSH: CPT

## 2018-05-04 PROCEDURE — 82803 BLOOD GASES ANY COMBINATION: CPT

## 2018-05-04 PROCEDURE — 84134 ASSAY OF PREALBUMIN: CPT

## 2018-05-04 PROCEDURE — 70450 CT HEAD/BRAIN W/O DYE: CPT

## 2018-05-04 PROCEDURE — S0179 MEGESTROL 20 MG: HCPCS

## 2018-05-04 RX ADMIN — FUROSEMIDE SCH MG: 20 TABLET ORAL at 09:16

## 2018-05-04 RX ADMIN — CARVEDILOL SCH MG: 6.25 TABLET, FILM COATED ORAL at 09:15

## 2018-05-04 RX ADMIN — MORPHINE SULFATE PRN MG: 2 INJECTION, SOLUTION INTRAMUSCULAR; INTRAVENOUS at 15:54

## 2018-05-04 RX ADMIN — FUROSEMIDE SCH: 20 TABLET ORAL at 09:37

## 2018-05-04 RX ADMIN — MEGESTROL ACETATE SCH MG: 40 TABLET ORAL at 09:15

## 2018-05-04 RX ADMIN — CARVEDILOL SCH: 6.25 TABLET, FILM COATED ORAL at 09:32

## 2018-05-04 RX ADMIN — MEGESTROL ACETATE SCH: 40 TABLET ORAL at 09:35

## 2018-05-04 RX ADMIN — PANTOPRAZOLE SODIUM SCH MG: 40 TABLET, DELAYED RELEASE ORAL at 09:16

## 2018-05-04 RX ADMIN — IPRATROPIUM BROMIDE AND ALBUTEROL SULFATE SCH EA: .5; 3 SOLUTION RESPIRATORY (INHALATION) at 13:10

## 2018-05-04 RX ADMIN — VANCOMYCIN HYDROCHLORIDE SCH MLS/HR: 1 INJECTION, POWDER, LYOPHILIZED, FOR SOLUTION INTRAVENOUS at 11:38

## 2018-05-04 RX ADMIN — IPRATROPIUM BROMIDE AND ALBUTEROL SULFATE SCH EA: .5; 3 SOLUTION RESPIRATORY (INHALATION) at 13:09

## 2018-05-04 RX ADMIN — AMIODARONE HYDROCHLORIDE SCH: 200 TABLET ORAL at 09:36

## 2018-05-04 RX ADMIN — ENOXAPARIN SODIUM SCH MG: 100 INJECTION SUBCUTANEOUS at 17:04

## 2018-05-04 RX ADMIN — FUROSEMIDE SCH MG: 10 INJECTION, SOLUTION INTRAMUSCULAR; INTRAVENOUS at 10:22

## 2018-05-04 RX ADMIN — IPRATROPIUM BROMIDE AND ALBUTEROL SULFATE SCH: .5; 3 SOLUTION RESPIRATORY (INHALATION) at 16:04

## 2018-05-04 RX ADMIN — IPRATROPIUM BROMIDE AND ALBUTEROL SULFATE SCH EA: .5; 3 SOLUTION RESPIRATORY (INHALATION) at 08:24

## 2018-05-04 RX ADMIN — MORPHINE SULFATE PRN MG: 2 INJECTION, SOLUTION INTRAMUSCULAR; INTRAVENOUS at 22:30

## 2018-05-04 RX ADMIN — SODIUM CHLORIDE SCH MLS/HR: 900 INJECTION INTRAVENOUS at 09:43

## 2018-05-04 RX ADMIN — MAGNESIUM SULFATE HEPTAHYDRATE PRN MLS/HR: 1 INJECTION, SOLUTION INTRAVENOUS at 05:48

## 2018-05-04 RX ADMIN — IPRATROPIUM BROMIDE AND ALBUTEROL SULFATE SCH EA: .5; 3 SOLUTION RESPIRATORY (INHALATION) at 04:11

## 2018-05-04 RX ADMIN — SODIUM CHLORIDE SCH MLS/HR: 900 INJECTION INTRAVENOUS at 21:06

## 2018-05-04 RX ADMIN — AMIODARONE HYDROCHLORIDE SCH MG: 200 TABLET ORAL at 09:16

## 2018-05-04 RX ADMIN — PANTOPRAZOLE SODIUM SCH: 40 TABLET, DELAYED RELEASE ORAL at 09:37

## 2018-05-04 RX ADMIN — MEGESTROL ACETATE SCH: 40 TABLET ORAL at 21:00

## 2018-05-04 RX ADMIN — SODIUM CHLORIDE SCH MLS/HR: 9 INJECTION, SOLUTION INTRAVENOUS at 11:51

## 2018-05-04 RX ADMIN — MAGNESIUM SULFATE HEPTAHYDRATE PRN MLS/HR: 1 INJECTION, SOLUTION INTRAVENOUS at 04:50

## 2018-05-04 RX ADMIN — IPRATROPIUM BROMIDE AND ALBUTEROL SULFATE SCH EA: .5; 3 SOLUTION RESPIRATORY (INHALATION) at 20:34

## 2018-05-04 NOTE — RAD
HISTORY:  Central line placement



Study:  Chest AP portable



Comparison: 05/04/2018 1:23 a.m.







Findings:



There is a left-sided central line with its tip in the superior vena cava. The heart is within normal
 limits in size. The aorta is calcified and ectatic. No congestive heart failure is noted. Diffuse in
terstitial infiltrates are again identified some of which are chronic. The does appear to be some sup
erimposed acute peribronchial alveolar infiltrate in the lower lobes bilaterally unchanged from the p
rior examination. No definite pleural effusions are identified.



IMPRESSION:

 

Left-sided central line tip superior vena cava, no pneumothorax



No change bilateral infiltrates likely a combination of chronic interstitial lung changes in acute pn
eumonia/pneumonitis







Reported By:Electronically Signed by CHANDAN MAGAÑA MD at 5/4/2018 11:52:01 AM

## 2018-05-04 NOTE — CT
History: Weakness



Study: CT brain without contrast. Sagittal and coronal reformations were provided.



Comparison: April 15, 2018



Findings: There is no interval change. There is moderate enlargement of ventricles and sulci. There i
s no hemorrhage or mass or edema. There is mild to moderate periventricular white matter low attenuat
ion. The calvarium is intact. The sinuses are clear.



Impression:



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 No acute intracranial diseas
e is demonstrated.



Reported By:Electronically Signed by FRANKY BURLESON MD at 5/4/2018 11:01:59 AM

## 2018-05-04 NOTE — DR.WEAKNES
HPI





- Time Seen


Time seen: 12:25





- Primary Care Physician


Primary Care Physician: REYMUNDO





- Complaints


Chief Complaint Doctors Comments: Patient presented to the ED for evaluation 

because of weaknes and falling all day. Family reports that he has not been 

eating as usual since he had EGD two weeks ago. Upon presentation he was 

hypotensive 90/54 oxygen sat 85% R18. There is no history of fever, vomiting or 

diarrhea.


Chief Complaint:: WEAKNESS/FALLING X 2DAYS





- Reviewed


Nurses Notes Reviewed: Yes





- Source


History Provided: Family Member, EMS





- Mode of Arrival


Mode of Arrival: EMS





- Timing


Onset of Chief Complaint: 05/02/18


Since onset, symptoms are:: Improved


Symptom Onset: Known (two weeks)





- Duration


Duration: Unknown


Duration: Weeks





- Context


Onset: Spontaneous


Symptoms: Weakness


History of: CVA


Stroke Symptoms: Weakness of limb





- Location


Weakness Location: Generalized





- Associated Signs and Symptoms


Associated Signs and Symptoms: None





PMH





- PMH


Past Medical History: Yes


Past Medical History: Anxiety, Arthritis, Coronary Artery Disease, Hypertension

, Hypothyroidism


Past Surgical History: Yes


Surgical History: Bowel Resection





- Family History


History of Family Medical Conditions: Yes


Family Medical History: Cancer, Heart Failure, Hypertension





- Social History


Does patient currently use any type of tobacco product: No


Have you used tobacco products in the last 12 months: No


Type of Tobacco Use: None


Does any household member use tobacco: No


Alcohol Use: None


Do you use any recreational Drugs:: No


Lives With: Spouse


Lives Where: Home





- infectious screening


In the last 2 months have you had wt loss of >10#?: NO


Have you had fever, night sweats or hemotysis?: No


Have you traveled outside the country in the last 6 months?: No


Isolation: Standard





ROS





- Review of Systems


Eyes: No Symptoms Reported


ENTM: No Symptoms Reported


Respiratoy: No Symptoms Reported


Cardiovascular: No Symptoms Reported


Gastrointestinal/Abdominal: No Symptoms Reported


Genitourinary: No Symptoms Reported


Neurological: No Symptoms Reported


Musculoskeletal: No Symptoms Reported


Integumentary: No Symptoms Reported


Hematologic/Lymphatic: No Symptoms Reported


Endocrine: No Symptoms Reported


Psychiatric: No Symptoms Reported


All Other Systems: Reviewed and Negative





PE





- Vital Signs


Vitals: 


 





Temperature                      98.7 F


Pulse Rate                       84


Respiratory Rate                 18


Blood Pressure [Left Arm]        122/78


Blood Pressure [Right Arm]       163/84


Blood Pressure [Right Radial     117/68


Artery]                          


Blood Pressure                   90/54


O2 Sat by Pulse Oximetry         85











- General


Limitations: No Limitations


General Appearance: Lethargic





- Head


Head Exam: Normal Inspection, Atraumatic


Head Exam Physical: negative: Laceration, Abrasion, Contusion, Hematoma, 

Raccoon Eyes, Goetz's Sign, Tenderness of Temporal Artery, CSF Rhinorrhea, CSF 

Otorrhea, Other





- Eyes


Eye exam: Normal Appearance


Eyelids: Normal Inspection: Bilateral


Pupils: Regular, Round: Bilateral


Sclera/Conjunctival: Normal Inspection: Bilateral


Anterior Chamber: Normal Inspection: Bilateral





- ENT


ENT Exam: Normal Exam, Mucous Membranes Dry


Mouth Exam: Normal Inspection


Throat Exam: Normal Inspection





- Neck


Neck Exam: Normal Inspection, Full ROM





- Chest


Chest Inspection: Normal Inspection, Symmetric Chest Wall Rise





- Respiratory


Respiratory Exam: Normal Lung Sounds Bilat


Respiratory Exam: Bilateral Clear to Auscultation





- Cardiovascular


Cardiovascular Exam: Regular Rate, Normal Rhythm





- Abdominal Exam


Abdominal Exam: Normal Inspection, Normal Bowel Sounds


Abdominal Tenderness: negative: RUQ, RLQ, LUQ, LLQ, Epigastrium, Suprapubic, 

Diffuse, Mild, Moderate, Severe, Other





- Extremities


Extremities Exam: Normal Inspection, Full ROM





- Back


Back Exam: Normal Inspection





- Neurologic


Neurological Exam: Alert, Oriented X3, CN II-XII Intact


Cranial Nerve Exam: EOM Function (II, III, IV, VI): Normal





- Psychiatric


Psychiatric Exam: Normal Affect, Normal Mood





- Skin


Skin Exam: Warm, Dry, Intact





Course





- Reevaluation


1st: Improved





- Consultation


Called: 20:00





ROR





- Labs Reviewed


Result Diagrams: 


 05/04/18 00:40





 05/04/18 00:40


Laboratory: 


 











WBC  10.5 X10^3/uL (3.6-10.0)  H  05/04/18  00:40    


 


RBC  2.95 X10^6/uL (4.7-6.0)  L  05/04/18  00:40    


 


Hgb  8.6 g/dL (13.5-18.0)  L  05/04/18  00:40    


 


Hct  25.7 % (42.0-54.0)  L  05/04/18  00:40    


 


MCV  86.9 fL (80.0-100.0)   05/04/18  00:40    


 


MCH  29.2 pg (27.0-34.0)   05/04/18  00:40    


 


MCHC  33.6 g/dL (33.0-35.0)   05/04/18  00:40    


 


RDW  15.8 % (11.6-16.5)   05/04/18  00:40    


 


Plt Count  226 X10^3/uL (150.0-450.0)   05/04/18  00:40    


 


MPV  8.7 fL (7.4-11.0)   05/04/18  00:40    


 


Neut % (Auto)  77.4 % (42.0-75.0)  H  05/04/18  00:40    


 


Lymph % (Auto)  13.0 % (21.0-51.0)  L  05/04/18  00:40    


 


Mono % (Auto)  8.1 % (0.0-13.0)   05/04/18  00:40    


 


Eos % (Auto)  0.7 % (0.9-2.9)  L  05/04/18  00:40    


 


Baso % (Auto)  0.8 % (0.2-1.0)   05/04/18  00:40    


 


Neut # (Auto)  8.1 x10^3/uL (2.2-4.8)  H  05/04/18  00:40    


 


Lymph # (Auto)  1.4 X10^3/uL (1.3-2.9)   05/04/18  00:40    


 


Mono # (Auto)  0.8 x10^3/uL (0.3-0.8)   05/04/18  00:40    


 


Eos # (Auto)  0.1 x10^3/uL (0.0-0.2)   05/04/18  00:40    


 


Baso # (Auto)  0.1 X10^3/uL (0.0-0.1)   05/04/18  00:40    


 


Absolute Nucleated RBC  0.0 /100WBC  05/04/18  00:40    


 


INR Target Range  -   05/04/18  00:40    


 


INR  1.33  (0.8-1.3)  H  05/04/18  00:40    


 


APTT  31.9 SECONDS (22.9-36.5)   05/04/18  00:40    


 


PTT Comment  -   05/04/18  00:40    


 


Sample Site  Rbra   05/04/18  00:55    


 


ABG pH  7.440  (7.35-7.45)   05/04/18  00:55    


 


ABG pCO2  31.0 mmHg (35.0-45.0)  L  05/04/18  00:55    


 


ABG pO2  38.0 mmHg (80.0-100.0)  L*  05/04/18  00:55    


 


ABG HCO3  21.1 mmol/L (22-26)  L  05/04/18  00:55    


 


ABG O2 Saturation  75.0 % ()  L*  05/04/18  00:55    


 


ABG Base Excess  -2.2 mmol/L (-2.0-2.0)  L  05/04/18  00:55    


 


Lex Test  Na   05/04/18  00:55    


 


A-a Gradient  151.0 mmHg  05/04/18  00:55    


 


FiO2  32.000   05/04/18  00:55    


 


Blood Gas Comments  John abg well-mtf   05/04/18  00:55    


 


Sodium  145 mmol/L (136-145)   05/04/18  00:40    


 


Corrected Sodium  146 mmol/L (136-145)  H  05/04/18  00:40    


 


Potassium  3.1 mmol/L (3.5-5.1)  L  05/04/18  00:40    


 


Chloride  108 mmol/L ()  H  05/04/18  00:40    


 


Carbon Dioxide  22.9 mmol/L (21-32)   05/04/18  00:40    


 


BUN  32 mg/dL (7-18)  H  05/04/18  00:40    


 


Creatinine  2.35 mg/dL (0.70-1.30)  H  05/04/18  00:40    


 


Est GFR (MDRD) Af Amer  35  (>60)  L  05/04/18  00:40    


 


Est GFR (MDRD) Non-Af  29  (>60)  L  05/04/18  00:40    


 


Glucose  121 mg/dL (65-99)  H  05/04/18  00:40    


 


Lactic Acid  1.7 mmol/L (0.4-2.0)   05/04/18  01:40    


 


Calcium  7.6 mg/dL (8.5-10.1)  L  05/04/18  00:40    


 


Corrected Calcium  8.9 mg/dL (8.5-10.1)   05/04/18  00:40    


 


Magnesium  1.6 mg/dL (1.7-2.9)  L  05/04/18  00:40    


 


Total Bilirubin  0.80 mg/dL (0.2-1.0)   05/04/18  00:40    


 


AST  28 Units/L (15-37)   05/04/18  00:40    


 


ALT  18 Units/L (12-78)   05/04/18  00:40    


 


Alkaline Phosphatase  63 Units/L ()   05/04/18  00:40    


 


Creatine Kinase  533 Units/L ()  H  05/04/18  00:40    


 


CK-MB (CK-2)  2.2 ng/mL (0-4.0)   05/04/18  00:40    


 


CK/CKMB % Calc  0.4 % (<4)   05/04/18  00:40    


 


Troponin I  < 0.02 ng/mL (0-1.5)   05/04/18  00:40    


 


Total Protein  6.9 g/dL (6.4-8.2)   05/04/18  00:40    


 


Albumin  2.4 g/dL (3.4-5.0)  L  05/04/18  00:40    


 


Globulin  4.5 g/dL (2.5-4.5)   05/04/18  00:40    


 


Albumin/Globulin Ratio  0.5 Ratio (1.1-2.1)  L  05/04/18  00:40    














- XRAY


XRAY Interpreted by: Radiologist (Chest: Trachea is midline.  heart size is 

enlarged, unchanged.  Chronic interstitial lung disease is noted; however, 

there is moderate increased interstitial opacities throughout the right lung 

most severly affecting the right lung base consistent with multi focal 

infiltrate/acute bronchitis.  Similar, less severe findings are noted within 

the left lung base.  No pleural effusion or pneumothorax.  Pleural parenchymal 

scarring is noted within the lung apices bilaterally.  Calcified 

atherosclerotic disease of a tortuous throacic aorta is noted. Impression: 

Acute interstitial pneumonia and /or bronchitis within the right uper, right 

lower and left lower lobes.2. Stable cardiomegaly.)





- Diagnosis


Discharge Problem: 


 Acute prerenal azotemia, Dehydration, Hypokalemia, Respiratory distress





Pneumonia


Qualifiers:


 Pneumonia type: due to unspecified organism Laterality: bilateral Lung location

: unspecified part of lung Qualified Code(s): J18.9 - Pneumonia, unspecified 

organism








- Discharge Plan


Condition: Stable





- Follow ups/Referrals


Follow ups/Referrals: 


Gilberto Curtis [Primary Care Provider] - 3 days





- Instructions

## 2018-05-04 NOTE — RAD
AP chest



Indication: Weakness with low O2 sat



Comparison: 04/11/2018



Findings: Trachea is midline. Heart size is enlarged, unchanged. Chronic interstitial lung disease is
 noted; however, there is moderate increased interstitial opacities throughout the right lung most se
verely affecting the right lung base consistent with multi focal infiltrate/acute bronchitis. Similar
, less severe findings are noted within the left lung base. No pleural effusion or pneumothorax. Pleu
ral parenchymal scarring is noted within the lung apices bilaterally. Calcified atherosclerotic disea
se of a tortuous thoracic aorta is noted. 



Impression: 

1.Acute interstitial pneumonia and/or bronchitis within the right upper, right lower and left lower l
obes.



2. Stable cardiomegaly.



Reported By:Electronically Signed by EMILY HERZOG MD at 5/4/2018 1:27:58 AM

## 2018-05-05 VITALS — DIASTOLIC BLOOD PRESSURE: 47 MMHG | SYSTOLIC BLOOD PRESSURE: 74 MMHG

## 2018-05-05 LAB
ALBUMIN SERPL BCP-MCNC: 2.3 G/DL (ref 3.4–5)
ALP SERPL-CCNC: 67 UNITS/L (ref 46–116)
ALT SERPL W P-5'-P-CCNC: 20 UNITS/L (ref 12–78)
AST SERPL W P-5'-P-CCNC: 36 UNITS/L (ref 15–37)
BASE EXCESS BLDA CALC-SCNC: 0.2 MMOL/L (ref -2–2)
BASOPHILS # BLD AUTO: 0.1 X10^3/UL (ref 0–0.1)
BASOPHILS NFR BLD AUTO: 0.5 % (ref 0.2–1)
BUN SERPL-MCNC: 25 MG/DL (ref 7–18)
CALCIUM ALBUM COR SERPL-SCNC: 143 MMOL/L (ref 136–145)
CALCIUM ALBUM COR SERPL-SCNC: 9.2 MG/DL (ref 8.5–10.1)
CALCIUM SERPL-MCNC: 7.8 MG/DL (ref 8.5–10.1)
CHLORIDE SERPL-SCNC: 108 MMOL/L (ref 98–107)
CK MB SERPL-MCNC: 2.1 NG/ML (ref 0–4)
CK SERPL-CCNC: 684 UNITS/L (ref 39–308)
CKMB %: 0.3 % (ref ?–4)
CO2 SERPL-SCNC: 22.1 MMOL/L (ref 21–32)
CREAT SERPL-MCNC: 1.56 MG/DL (ref 0.7–1.3)
EGFR  BLACK RACES: 56 (ref 60–?)
EOSINOPHIL # BLD AUTO: 0 X10^3/UL (ref 0–0.2)
EOSINOPHIL NFR BLD AUTO: 0.3 % (ref 0.9–2.9)
ERYTHROCYTE [DISTWIDTH] IN BLOOD BY AUTOMATED COUNT: 15.6 % (ref 11.6–16.5)
FRACTIONATED INSPIRED OXYGEN: 80
HCO3 BLDA-SCNC: 23.5 MMOL/L (ref 22–26)
HCT VFR BLD AUTO: 27.4 % (ref 42–54)
HGB BLD-MCNC: 9.1 G/DL (ref 13.5–18)
LYMPHOCYTES # BLD AUTO: 0.9 X10^3/UL (ref 1.3–2.9)
LYMPHOCYTES NFR BLD AUTO: 6.8 % (ref 21–51)
MAGNESIUM SERPL-MCNC: 1.8 MG/DL (ref 1.7–2.9)
MCH RBC QN AUTO: 28.6 PG (ref 27–34)
MCHC RBC AUTO-ENTMCNC: 33.2 G/DL (ref 33–35)
MCV RBC AUTO: 86.2 FL (ref 80–100)
MONOCYTES # BLD AUTO: 0.8 X10^3/UL (ref 0.3–0.8)
MONOCYTES NFR BLD AUTO: 6 % (ref 0–13)
NEUTROPHILS # BLD AUTO: 11.7 X10^3/UL (ref 2.2–4.8)
NEUTROPHILS NFR BLD AUTO: 86.4 % (ref 42–75)
PCO2 BLDA: 33 MMHG (ref 35–45)
PH BLDA: 7.46 [PH] (ref 7.35–7.45)
PLATELET # BLD: 230 X10^3/UL (ref 150–450)
PMV BLD AUTO: 9.2 FL (ref 7.4–11)
PO2 BLDA: 72 MMHG (ref 80–100)
PREALB SERPL-MCNC: 10.5 MG/DL (ref 18–35.7)
PROT SERPL-MCNC: 7.2 G/DL (ref 6.4–8.2)
RBC # BLD AUTO: 3.18 X10^6/UL (ref 4.7–6)
SAO2 % BLDA: 95 % (ref 90–100)
SODIUM SERPL-SCNC: 142 MMOL/L (ref 136–145)
TRIGL SERPL-MCNC: 72 MG/DL (ref 0–150)
TROPONIN I SERPL-MCNC: 0.14 NG/ML (ref 0–1.5)
WBC NRBC COR # BLD AUTO: 13.6 X10^3/UL (ref 3.6–10)

## 2018-05-05 RX ADMIN — MIDAZOLAM PRN MG: 1 INJECTION INTRAMUSCULAR; INTRAVENOUS at 09:47

## 2018-05-05 RX ADMIN — IPRATROPIUM BROMIDE AND ALBUTEROL SULFATE SCH EA: .5; 3 SOLUTION RESPIRATORY (INHALATION) at 04:14

## 2018-05-05 RX ADMIN — MORPHINE SULFATE PRN MG: 2 INJECTION, SOLUTION INTRAMUSCULAR; INTRAVENOUS at 03:08

## 2018-05-05 RX ADMIN — SODIUM CHLORIDE SCH MLS/HR: 900 INJECTION INTRAVENOUS at 08:19

## 2018-05-05 RX ADMIN — IPRATROPIUM BROMIDE AND ALBUTEROL SULFATE SCH EA: .5; 3 SOLUTION RESPIRATORY (INHALATION) at 08:43

## 2018-05-05 RX ADMIN — MIDAZOLAM PRN MG: 1 INJECTION INTRAMUSCULAR; INTRAVENOUS at 10:32

## 2018-05-05 RX ADMIN — FUROSEMIDE SCH MG: 10 INJECTION, SOLUTION INTRAMUSCULAR; INTRAVENOUS at 08:17

## 2018-05-05 RX ADMIN — ENOXAPARIN SODIUM SCH MG: 100 INJECTION SUBCUTANEOUS at 08:17

## 2018-05-05 RX ADMIN — PANTOPRAZOLE SODIUM SCH: 40 TABLET, DELAYED RELEASE ORAL at 08:23

## 2018-05-05 RX ADMIN — IPRATROPIUM BROMIDE AND ALBUTEROL SULFATE SCH: .5; 3 SOLUTION RESPIRATORY (INHALATION) at 13:00

## 2018-05-05 RX ADMIN — AMIODARONE HYDROCHLORIDE SCH: 200 TABLET ORAL at 08:16

## 2018-05-05 RX ADMIN — VANCOMYCIN HYDROCHLORIDE SCH MLS/HR: 1 INJECTION, POWDER, LYOPHILIZED, FOR SOLUTION INTRAVENOUS at 08:18

## 2018-05-05 RX ADMIN — SODIUM CHLORIDE SCH: 9 INJECTION, SOLUTION INTRAVENOUS at 10:02

## 2018-05-05 RX ADMIN — IPRATROPIUM BROMIDE AND ALBUTEROL SULFATE SCH: .5; 3 SOLUTION RESPIRATORY (INHALATION) at 18:36

## 2018-05-05 RX ADMIN — MEGESTROL ACETATE SCH: 40 TABLET ORAL at 08:20

## 2018-05-05 RX ADMIN — IPRATROPIUM BROMIDE AND ALBUTEROL SULFATE SCH EA: .5; 3 SOLUTION RESPIRATORY (INHALATION) at 01:07

## 2018-05-05 RX ADMIN — MIDAZOLAM PRN MG: 1 INJECTION INTRAMUSCULAR; INTRAVENOUS at 11:40

## 2018-05-05 NOTE — RAD
HISTORY:  Shortness breath



Study: Single-view chest



Comparison: Yesterday



Findings:



The trachea is midline.  The cardiac silhouette is unremarkable. Stable left-sided central line is no
abbi.  The lungs demonstrate stable interstitial infiltrates are unchanged..  The bony thorax is unrem
arkable.  



IMPRESSION: 

 

1.  Stable interstitial infiltrates.







Reported By:Electronically Signed by FESTUS VASQUEZ MD at 5/5/2018 7:48:05 AM

## 2021-11-06 NOTE — RAD
Examination: Portable AP chest



History: SOB



Comparison reference 05/08/2015



Findings: Continued normal heart size with essentially clear lungs. Slight indistinctness of left nroma
phragm is nonspecific. Arteriosclerotic and dilated thoracic aorta.



Impression: No acute process or significant interval change demonstrated. Standard PA and lateral vie
ws suggested for more accurate evaluation when condition allows.



Reported By:Electronically Signed by CINDA ESPINOZA MD at 4/11/2018 2:41:07 PM normal balance